# Patient Record
Sex: FEMALE | Race: BLACK OR AFRICAN AMERICAN | Employment: PART TIME | URBAN - METROPOLITAN AREA
[De-identification: names, ages, dates, MRNs, and addresses within clinical notes are randomized per-mention and may not be internally consistent; named-entity substitution may affect disease eponyms.]

---

## 2019-03-17 ENCOUNTER — ED HISTORICAL/CONVERTED ENCOUNTER (OUTPATIENT)
Dept: OTHER | Age: 59
End: 2019-03-17

## 2019-12-30 ENCOUNTER — ED HISTORICAL/CONVERTED ENCOUNTER (OUTPATIENT)
Dept: OTHER | Age: 59
End: 2019-12-30

## 2020-10-13 ENCOUNTER — HOSPITAL ENCOUNTER (EMERGENCY)
Age: 60
Discharge: HOME | End: 2020-10-13
Attending: EMERGENCY MEDICINE
Payer: MEDICAID

## 2020-10-13 VITALS
HEIGHT: 68 IN | WEIGHT: 175 LBS | OXYGEN SATURATION: 100 % | TEMPERATURE: 98.2 F | RESPIRATION RATE: 16 BRPM | HEART RATE: 88 BPM | SYSTOLIC BLOOD PRESSURE: 138 MMHG | BODY MASS INDEX: 26.52 KG/M2 | DIASTOLIC BLOOD PRESSURE: 83 MMHG

## 2020-10-13 DIAGNOSIS — S63.501A SPRAIN OF RIGHT WRIST, INITIAL ENCOUNTER: Primary | ICD-10-CM

## 2020-10-13 PROCEDURE — 99283 EMERGENCY DEPT VISIT LOW MDM: CPT

## 2020-10-13 PROCEDURE — 74011250637 HC RX REV CODE- 250/637: Performed by: EMERGENCY MEDICINE

## 2020-10-13 RX ORDER — IBUPROFEN 800 MG/1
800 TABLET ORAL
Qty: 20 TAB | Refills: 0 | Status: SHIPPED | OUTPATIENT
Start: 2020-10-13 | End: 2020-10-20

## 2020-10-13 RX ORDER — ERGOCALCIFEROL 1.25 MG/1
50000 CAPSULE ORAL
COMMUNITY

## 2020-10-13 RX ORDER — ACETAMINOPHEN 500 MG
1000 TABLET ORAL ONCE
Status: COMPLETED | OUTPATIENT
Start: 2020-10-13 | End: 2020-10-13

## 2020-10-13 RX ORDER — IBUPROFEN 800 MG/1
800 TABLET ORAL ONCE
Status: COMPLETED | OUTPATIENT
Start: 2020-10-13 | End: 2020-10-13

## 2020-10-13 RX ORDER — ALBUTEROL SULFATE 90 UG/1
AEROSOL, METERED RESPIRATORY (INHALATION)
COMMUNITY

## 2020-10-13 RX ORDER — MONTELUKAST SODIUM 10 MG/1
10 TABLET ORAL DAILY
COMMUNITY

## 2020-10-13 RX ADMIN — IBUPROFEN 800 MG: 800 TABLET, FILM COATED ORAL at 20:59

## 2020-10-13 RX ADMIN — ACETAMINOPHEN 1000 MG: 500 TABLET, FILM COATED ORAL at 20:59

## 2020-10-14 NOTE — ED PROVIDER NOTES
EMERGENCY DEPARTMENT HISTORY AND PHYSICAL EXAM      Date: 10/13/2020  Patient Name: Carmela Gonzalez    History of Presenting Illness     Chief Complaint   Patient presents with    Arm Pain    Hand Pain       History Provided By: Patient    HPI: Carmela Gonzalez, 64 y.o. female   presents to the ED with cc of right wrist and hand pain. Patient complaining of right wrist and hand pain for several days with a fluctuating intensity. The pain is aggravated with movement and alleviated with rest.  Patient has been taking acetaminophen with some relief. Patient denies any obvious injury. Patient is right dominant and may have overused it as per patient. Patient denies neck pain and any other joint pain. PCP: No primary care provider on file. No current facility-administered medications on file prior to encounter. Current Outpatient Medications on File Prior to Encounter   Medication Sig Dispense Refill    albuterol (PROVENTIL HFA, VENTOLIN HFA, PROAIR HFA) 90 mcg/actuation inhaler Take  by inhalation.  montelukast (Singulair) 10 mg tablet Take 10 mg by mouth daily.  ergocalciferol (Vitamin D2) 1,250 mcg (50,000 unit) capsule Take 50,000 Units by mouth. Past History     Past Medical History:  History reviewed. No pertinent past medical history. Past Surgical History:  History reviewed. No pertinent surgical history. Family History:  History reviewed. No pertinent family history. Social History:  Social History     Tobacco Use    Smoking status: Never Smoker    Smokeless tobacco: Never Used   Substance Use Topics    Alcohol use: Yes     Comment: occasionally    Drug use: Never       Allergies: Allergies   Allergen Reactions    Codeine Other (comments)     vomiting         Review of Systems   Review of Systems   Constitutional: Negative for chills and fever. HENT: Negative for sore throat. Eyes: Negative for discharge. Respiratory: Negative for shortness of breath. Cardiovascular: Negative for chest pain. Gastrointestinal: Negative for abdominal distention. Neurological: Negative for headaches. Physical Exam   Physical Exam  Vitals signs and nursing note reviewed. Constitutional:       Appearance: Normal appearance. HENT:      Head: Normocephalic and atraumatic. Mouth/Throat:      Mouth: Mucous membranes are moist.   Eyes:      Conjunctiva/sclera: Conjunctivae normal.   Neck:      Musculoskeletal: Neck supple. Cardiovascular:      Heart sounds: Normal heart sounds. Pulmonary:      Breath sounds: Normal breath sounds. Abdominal:      General: Abdomen is flat. Palpations: Abdomen is soft. Musculoskeletal:      Comments: Right wrist and right hand normal without swelling or deformity. The pain is aggravated with the movement of the wrist.   Skin:     General: Skin is warm and dry. Neurological:      General: No focal deficit present. Mental Status: She is alert. Psychiatric:         Behavior: Behavior normal.         Diagnostic Study Results     Labs -   No results found for this or any previous visit (from the past 12 hour(s)). Radiologic Studies -   No orders to display     CT Results  (Last 48 hours)    None        CXR Results  (Last 48 hours)    None            Medical Decision Making   I am the first provider for this patient. I reviewed the vital signs, available nursing notes, past medical history, past surgical history, family history and social history. Vital Signs-Reviewed the patient's vital signs. Patient Vitals for the past 12 hrs:   Temp Pulse Resp BP SpO2   10/13/20 2006 98.2 °F (36.8 °C) 88 16 138/83 100 %       Records Reviewed:     Provider Notes (Medical Decision Making):       ED Course:   Initial assessment performed. The patients presenting problems have been discussed, and they are in agreement with the care plan formulated and outlined with them.   I have encouraged them to ask questions as they arise throughout their visit. PROCEDURES        PLAN:  1. Current Discharge Medication List      START taking these medications    Details   ibuprofen (MOTRIN) 800 mg tablet Take 1 Tab by mouth every eight (8) hours as needed for Pain for up to 7 days. Qty: 20 Tab, Refills: 0           2. Follow-up Information     Follow up With Specialties Details Why Contact Info    Follow up with [address] [place] [address] [phone]  Schedule an appointment as soon as possible for a visit today          Return to ED if worse     Diagnosis     Clinical Impression:   1.  Sprain of right wrist, initial encounter

## 2020-10-14 NOTE — ED TRIAGE NOTES
Pt with hx of arthritis in left arm presents today with right arm and hand pain x 3-4 weeks.   Pt states taking tylenol and aleve at home without improvement

## 2021-11-03 ENCOUNTER — HOSPITAL ENCOUNTER (OUTPATIENT)
Dept: PHYSICAL THERAPY | Age: 61
Discharge: HOME | End: 2021-11-03
Payer: MEDICAID

## 2021-11-03 PROCEDURE — 97161 PT EVAL LOW COMPLEX 20 MIN: CPT

## 2021-11-03 NOTE — PROGRESS NOTES
[address], [address] [facility]  Phone: [phone]   Fax: [phone]    PT INITIAL EVALUATION NOTE - MCR 2-15  Plan of Care/Statement of Necessity for Physical Therapy Services  2-15    Patient Name: Lazaro Finley  Date:11/3/2021  : 1960  [x]  Patient  Verified  Provider#: [id number]  Payor: BLUE CROSS MEDICAID / Plan: Ileana Huerta PLUS / Product Type: Managed Care Medicaid /    Referral source: Chu Mims MD   In time:1230  Out time:125  Total Treatment Time (min): 55  Total Timed Codes (min): 0     Visit #: 1      Start of Care: 11/3/21      Onset Date: INSIDIOUS FOR AT LEAST A YEAR. WORSE X LAST 3 MONTHS     Treatment Area/Diagnosis: Low back pain, unspecified [M54.50]    SUBJECTIVE  Pain Level (0-10 scale): 6                Best: 6           Worst:  8  Description: RIGHT SORE NECK AT TIMES AND RIGHT SORE LOW BACK  HAD MRI IN PAST: L4L5 BONE ON BONE  Any medication changes, allergies to medications, adverse drug reactions, diagnosis change, or new procedure performed?: [] No    [x] Yes (see summary sheet for update)    Subjective:    BEEN YEARS SINCE I'VE HAD PT SO NEED REHAB  PLOF: I USED TO NOT FEEL THE PAIN  Mechanism of Injury: INSIDIOUS  Previous Treatment/Compliance: UNKNOWN  PMHx:OA, ASTHMA  Surgical Hx: CYST REMOVED FROM DORSAL WRIST  Prior Hospitalization: see medical history   Medications: Verified on Patient Summary List  Work Hx: [employer]  Living Situation: [address] A GRANDCHILD. WALK AT MALL  Pt Goals: EASE THE PAIN  Barriers: NONE  Motivation: WNL  Substance use: NONE  FABQ Score: AMPAC=58%  Cognition: A & O x WNL        OBJECTIVE/EXAMINATION  POSTURE WNL. GAIT WNL. STAIRS WNL  WALK TOES/HEELS WNL. AROM AND MMTs NECK WNL. NOT TTP. SUPINE; NO LLD. KNEE ALIGNMENT WNL. ARMS OVERHEAD WNL. PRONE; PROM HIPS WNL. PROM KNEE FLEX 0-135 BILAT.  INSPECTION/PALPATION OF BACK WNL  HAND  WNL 75LB RIGHT, 70 LEFT        With   [x] TE   [] TA   [] neuro   [] other: Patient Education: [x] Review HEP DPS; WALK BUT NOT AT MALL. PER PATIENT MALL WALKING MAKES HER HURT BUT SHE'S FINE ON OTHER SURFACES   [] Progressed/Changed HEP based on:   [] positioning   [] body mechanics   [] transfers   [] heat/ice application    [] other:        TUG: WNL    Pain Level (0-10 scale) post treatment: 6    ASSESSMENT/Key Information/Changes in Function:   EXAM WNL YET HURTS SO CORE EXERCISES INDICATED. Problem List: pain affecting function    Short Term Goals: To be accomplished in 4 weeks:   INDEPENDENT IN HEP  Long Term Goals: To be accomplished in 8  weeks:   RESTORE TO FULL ADLs KEEPING PAIN SCORES <4/10  Patient Goal (s): GET RID OF PAIN    Evaluation Complexity History LOW Complexity : Zero comorbidities / personal factors that will impact the outcome / POC; Examination LOW Complexity : 1-2 Standardized tests and measures addressing body structure, function, activity limitation and / or participation in recreation  ;Presentation LOW Complexity : Stable, uncomplicated  ;Clinical Decision Making MEDIUM Complexity : FOTO score of 26-74  Overall Complexity Rating: LOW      Patient / Family readiness to learn indicated by: asking questions  Persons(s) to be included in education: patient (P)  Barriers to Learning/Limitations: None  Patient Self Reported Health Status: good  Rehabilitation Potential: good  Patient/ Caregiver education and instruction: exercises      PLAN:  Treatment Plan may include any combination of the following: Therapeutic exercise and Manual therapy  HEP Issued: DPS    Frequency / Duration: Patient to be seen 1-2 times per week for 8 weeks.     [x]  Plan of care has been reviewed with PTA    Certification Period: 11/3/21  to  2/2/21    Nuria Adkins, PT 11/3/2021     ________________________________________________________________________    I certify that the above Therapy Services are being furnished while the patient is under my care. I agree with the treatment plan and certify that this therapy is necessary.     Physician's Signature:____________________  Date:____________Time: _________            Kristina Sevilla MD 2

## 2021-12-06 ENCOUNTER — APPOINTMENT (OUTPATIENT)
Dept: PHYSICAL THERAPY | Age: 61
End: 2021-12-06
Payer: MEDICAID

## 2021-12-10 ENCOUNTER — HOSPITAL ENCOUNTER (OUTPATIENT)
Dept: PHYSICAL THERAPY | Age: 61
Discharge: HOME | End: 2021-12-10
Payer: MEDICAID

## 2021-12-10 PROCEDURE — 97110 THERAPEUTIC EXERCISES: CPT

## 2021-12-10 NOTE — PROGRESS NOTES
PT DAILY TREATMENT NOTE - [facility] 2-15    Patient Name: Lisa Gill  Date:12/10/2021  : 1960  [x]  Patient  Verified  Payor: BLUE CROSS MEDICAID / Plan: [facility] [employer]ALTH[employer]EEPERS PLUS / Product Type: Managed Care Medicaid /    In time: 0230 pm  Out time:0305 pm  Total Treatment Time (min): 35  Total Timed Codes (min): 30  1:1 Treatment Time ([address] only): 30   Visit #:  2    Treatment Area: Other low back pain [M54.59]    SUBJECTIVE  Pain Level (0-10 scale): 7  Any medication changes, allergies to medications, adverse drug reactions, diagnosis change, or new procedure performed?: [x] No    [] Yes (see summary sheet for update)  Subjective functional status/changes:   [] No changes reported  Just got off work and I've got right much pain. Patient has not been seen since evaluation on 11/3/21 as she had to wait for insurance authorization. OBJECTIVE  30 min Therapeutic Exercise:  [x] See flow sheet :   Rationale: increase ROM and increase strength to improve the patients ability to perform personal, household and professional ADLs without pain      With   [] TE   [] TA   [] Neuro   [] SC   [] other: Patient Education: [x] Review HEP    [] Progressed/Changed HEP based on:   [] positioning   [] body mechanics   [] transfers   [] heat/ice application    [] other:      Other Objective/Functional Measures: Independent bed mobility. Tight quads     Pain Level (0-10 scale) post treatment: 6    ASSESSMENT/Changes in Function:   Exercises done well with report of decreased discomfort at the conclusion of her visit. Delay in beginning treatment as insurance authorization took 1 month to come through. Patient will continue to benefit from skilled PT services to modify and progress therapeutic interventions and analyze and modify body mechanics/ergonomics to attain remaining goals. Short Term Goals: To be accomplished in 4 weeks:              INDEPENDENT IN HEP  Long Term Goals:  To be accomplished in 8 weeks: RESTORE TO FULL ADLs KEEPING PAIN SCORES <4/10  Patient Goal (s): GET RID OF PAIN    [x]  See Plan of Care  []  See progress note/recertification  []  See Discharge Summary            Progress towards goals / Updated goals:  No change to date, first visit.     PLAN  [x]  Upgrade activities as tolerated     [x]  Continue plan of care  []  Update interventions per flow sheet       []  Discharge due to:_  []  Other:_      Alan Austin, PTA 12/10/2021

## 2021-12-13 ENCOUNTER — APPOINTMENT (OUTPATIENT)
Dept: PHYSICAL THERAPY | Age: 61
End: 2021-12-13
Payer: MEDICAID

## 2021-12-17 ENCOUNTER — HOSPITAL ENCOUNTER (OUTPATIENT)
Dept: PHYSICAL THERAPY | Age: 61
Discharge: HOME | End: 2021-12-17
Payer: MEDICAID

## 2021-12-17 PROCEDURE — 97110 THERAPEUTIC EXERCISES: CPT

## 2021-12-17 NOTE — PROGRESS NOTES
PT DAILY TREATMENT NOTE - [facility] 2-15    Patient Name: Carine Valdez  Date:2021  : 1960  [x]  Patient  Verified  Payor: BLUE CROSS MEDICAID / Plan: [facility] HEALTHKEEPERS PLUS / Product Type: Managed Care Medicaid /    In time: 9028 pm  Out time:0325 pm  Total Treatment Time (min): 50  Total Timed Codes (min): 45  1:1 Treatment Time ( [address] only): 45   Visit #:  3    Treatment Area: Other low back pain [M54.59]    SUBJECTIVE  Pain Level (0-10 scale): 6  Any medication changes, allergies to medications, adverse drug reactions, diagnosis change, or new procedure performed?: [x] No    [] Yes (see summary sheet for update)  Subjective functional status/changes:   [] No changes reported  Just got off work and I've got right much pain. The pain is the worst on my right side, the hip and down into the leg. Saturday I had a lot of pain. OBJECTIVE  45 min Therapeutic Exercise:  [x] See flow sheet :   Rationale: increase ROM and increase strength to improve the patients ability to perform personal, household and professional ADLs without pain      With   [] TE   [] TA   [] Neuro   [] SC   [] other: Patient Education: [x] Review HEP    [] Progressed/Changed HEP based on:   [] positioning   [] body mechanics   [] transfers   [] heat/ice application    [] other:      Other Objective/Functional Measures: Independent bed mobility. Tight quads, Right > Left     Pain Level (0-10 scale) post treatment: 1    ASSESSMENT/Changes in Function:   Exercises done well with report of decreased discomfort at the conclusion of her visit. She stated that [place] helped a lot. Patient will continue to benefit from skilled PT services to modify and progress therapeutic interventions and analyze and modify body mechanics/ergonomics to attain remaining goals. Short Term Goals: To be accomplished in 4 weeks:              INDEPENDENT IN HEP  Long Term Goals:  To be accomplished in 8  weeks:              RESTORE TO FULL ADLs KEEPING PAIN SCORES <4/10  Patient Goal (s): GET RID OF PAIN    [x]  See Plan of Care  []  See progress note/recertification  []  See Discharge Summary            Progress towards goals / Updated goals:  No change to date, first visit.     PLAN  [x]  Upgrade activities as tolerated     [x]  Continue plan of care  []  Update interventions per flow sheet       []  Discharge due to:_  []  Other:_      Josie Gonzales, SUSANNA 12/17/2021

## 2022-03-01 NOTE — PROGRESS NOTES
[address]  [facility], [address] [facility]  Phone: [phone]   Fax: [phone]    Medicaid Discharge Summary  2-15    Patient name: Salazar Sanders  : 1960  Provider#: [id number]  Referral source: Brendon Kirkpatrick MD      Medical/Treatment Diagnosis: Other low back pain [M54.59]     Prior Hospitalization: see medical history     Comorbidities: See Plan of Care  Prior Level of Function:See Plan of Care  Medications: Verified on Patient Summary List    Start of Care: 11/3/21      Onset Date:\"a year ago\"   Visits from Start of Care: 3    Missed Visits: lost to f/up after 21  Reporting Period : 11/3/21 to 3/1/22      ASSESSMENT/SUMMARY OF CARE: \"I did not improve with PT.\"    FOTO Functional Measure: 58/100  Discharge  58/100  Intake  Goals not achieved  Short Term Goals: To be accomplished in 4 weeks:              INDEPENDENT IN HEP; not met  Long Term Goals: To be accomplished in 8  weeks:              RESTORE TO FULL ADLs KEEPING PAIN SCORES <4/10; Not met  Patient Goal (s): GET RID OF PAIN; Not met        RECOMMENDATIONS:  []Discontinue therapy: []Patient has reached or is progressing toward set goals      []Patient is non-compliant or has abdicated      []Due to lack of appreciable progress towards set goals      []Other    Sharon London, PT 3/1/2022       ______________________________________________________________________  NOTE TO PHYSICIAN:  Please complete the following and fax to:  [address]:   Fax: [phone]. Retain this original for your records. If you are unable to process this request in 24 hours, please contact our office.      Physician's Signature:____________________  Date:____________Time:_________              Brendon Kirkpatrick MD